# Patient Record
Sex: FEMALE | Race: OTHER
[De-identification: names, ages, dates, MRNs, and addresses within clinical notes are randomized per-mention and may not be internally consistent; named-entity substitution may affect disease eponyms.]

---

## 2017-07-04 ENCOUNTER — HOSPITAL ENCOUNTER (EMERGENCY)
Dept: HOSPITAL 62 - ER | Age: 62
LOS: 1 days | Discharge: HOME | End: 2017-07-05
Payer: SELF-PAY

## 2017-07-04 VITALS — DIASTOLIC BLOOD PRESSURE: 94 MMHG | SYSTOLIC BLOOD PRESSURE: 156 MMHG

## 2017-07-04 DIAGNOSIS — R11.0: ICD-10-CM

## 2017-07-04 DIAGNOSIS — Z79.899: ICD-10-CM

## 2017-07-04 DIAGNOSIS — E78.5: ICD-10-CM

## 2017-07-04 DIAGNOSIS — R42: Primary | ICD-10-CM

## 2017-07-04 DIAGNOSIS — I10: ICD-10-CM

## 2017-07-04 DIAGNOSIS — E87.6: ICD-10-CM

## 2017-07-04 LAB
APPEARANCE UR: CLEAR
BASOPHILS # BLD AUTO: 0.1 10^3/UL (ref 0–0.2)
BASOPHILS NFR BLD AUTO: 1.1 % (ref 0–2)
BILIRUB UR QL STRIP: NEGATIVE
EOSINOPHIL # BLD AUTO: 0.2 10^3/UL (ref 0–0.6)
EOSINOPHIL NFR BLD AUTO: 2.2 % (ref 0–6)
ERYTHROCYTE [DISTWIDTH] IN BLOOD BY AUTOMATED COUNT: 13.1 % (ref 11.5–14)
GLUCOSE UR STRIP-MCNC: NEGATIVE MG/DL
HCT VFR BLD CALC: 43.7 % (ref 36–47)
HGB BLD-MCNC: 14.6 G/DL (ref 12–15.5)
HGB HCT DIFFERENCE: 0.1
KETONES UR STRIP-MCNC: NEGATIVE MG/DL
LYMPHOCYTES # BLD AUTO: 3.5 10^3/UL (ref 0.5–4.7)
LYMPHOCYTES NFR BLD AUTO: 38.4 % (ref 13–45)
MCH RBC QN AUTO: 30.7 PG (ref 27–33.4)
MCHC RBC AUTO-ENTMCNC: 33.5 G/DL (ref 32–36)
MCV RBC AUTO: 92 FL (ref 80–97)
MONOCYTES # BLD AUTO: 0.6 10^3/UL (ref 0.1–1.4)
MONOCYTES NFR BLD AUTO: 7 % (ref 3–13)
NEUTROPHILS # BLD AUTO: 4.6 10^3/UL (ref 1.7–8.2)
NEUTS SEG NFR BLD AUTO: 51.3 % (ref 42–78)
NITRITE UR QL STRIP: NEGATIVE
PH UR STRIP: 7 [PH] (ref 5–9)
PROT UR STRIP-MCNC: NEGATIVE MG/DL
RBC # BLD AUTO: 4.76 10^6/UL (ref 3.72–5.28)
SP GR UR STRIP: 1
UROBILINOGEN UR-MCNC: NEGATIVE MG/DL (ref ?–2)
WBC # BLD AUTO: 9 10^3/UL (ref 4–10.5)

## 2017-07-04 PROCEDURE — 85025 COMPLETE CBC W/AUTO DIFF WBC: CPT

## 2017-07-04 PROCEDURE — 93010 ELECTROCARDIOGRAM REPORT: CPT

## 2017-07-04 PROCEDURE — 93005 ELECTROCARDIOGRAM TRACING: CPT

## 2017-07-04 PROCEDURE — 99284 EMERGENCY DEPT VISIT MOD MDM: CPT

## 2017-07-04 PROCEDURE — 36415 COLL VENOUS BLD VENIPUNCTURE: CPT

## 2017-07-04 PROCEDURE — 81001 URINALYSIS AUTO W/SCOPE: CPT

## 2017-07-04 PROCEDURE — S0119 ONDANSETRON 4 MG: HCPCS

## 2017-07-04 PROCEDURE — 80048 BASIC METABOLIC PNL TOTAL CA: CPT

## 2017-07-04 NOTE — ER DOCUMENT REPORT
ED General





- General


Chief Complaint: Dizziness, can't sleep


Stated Complaint: FEELING DIZZY/CANNOT SLEEP


Time Seen by Provider: 17 22:59


Notes: 


Patient is a 62 year old female that comes emergency department for chief 

complaint of of a strange floating sensation and lightheadedness that she gets 

when she turns her head to the side.  She states she got up this morning and 

this evening, she felt a little bit of nausea with it when it happened.  She 

denies any current symptoms.  She denies head injury, focal numbness or weakness

, visual changes, headache, chest pain, shortness of breath.  Patient is on 

blood pressure medications and medications for hyperlipidemia.  She is 

compliant with her medications.  She is Portuguese-speaking, and Pikimal 

 symptom was used.





TRAVEL OUTSIDE OF THE [place] IN LAST 30 DAYS: No





- Related Data


Allergies/Adverse Reactions: 


 





No Known Allergies Allergy (Verified 16 13:27)


 








Home Medications: 


 Current Home Medications





Losartan/Hydrochlorothiazide [Hyzaar 100-25 Tablet] 1 each PO DAILY 17 [

History]











Past Medical History





- General


Information source: Patient





- Social History


Smoking Status: Never Smoker


Frequency of alcohol use: None


Drug Abuse: None


Lives with: Family


Family History: None, Reviewed & Not Pertinent





- Past Medical History


Cardiac Medical History: Reports: Hx Hypertension


Neurological Medical History: Reports: Hx Seizures


Renal/ Medical History: Denies: Hx Peritoneal Dialysis


Past Surgical History: Reports: Hx  Section - 2, Hx Cholecystectomy - 

unsure may have been removal gall stones





- Immunizations


Hx Diphtheria, Pertussis, Tetanus Vaccination: Yes - unknown





Review of Systems





- Review of Systems


Constitutional: No symptoms reported


EENT: No symptoms reported


Cardiovascular: See HPI


Respiratory: No symptoms reported


Gastrointestinal: No symptoms reported


Genitourinary: No symptoms reported


Female Genitourinary: No symptoms reported


Musculoskeletal: No symptoms reported


Skin: No symptoms reported


Hematologic/Lymphatic: No symptoms reported


Neurological/Psychological: See HPI





Physical Exam





- Vital signs


Vitals: 


 











Temp Pulse BP Pulse Ox


 


 97.8 F   79   156/94 H  98 


 


 17 22:00  17 22:00  17 22:00  17 22:00











Interpretation: Normal





- General


General appearance: Appears well, Alert


In distress: None - Smiling, alert, well-appearing





- HEENT


Head: Normocephalic, Atraumatic


Eyes: Normal


Conjunctiva: Normal


Extraocular movements intact: Yes


Eyelashes: Normal


Pupils: PERRL


Nasal: Normal


Mouth/Lips: Normal


Mucous membranes: Normal


Pharynx: Normal


Neck: Normal





- Respiratory


Respiratory status: No respiratory distress


Chest status: Nontender


Breath sounds: Normal


Chest palpation: Normal





- Cardiovascular


Rhythm: Regular


Heart sounds: Normal auscultation


Murmur: No





- Abdominal


Inspection: Normal


Distension: No distension


Bowel sounds: Normal


Tenderness: Nontender.  No: Tender, Guarding


Organomegaly: No organomegaly





- Back


Back: Normal, Nontender.  No: Tender, Vertebra tenderness





- Extremities


General upper extremity: Normal inspection, Nontender, Normal color, Normal ROM

, Normal temperature


General lower extremity: Normal inspection, Nontender, Normal color, Normal ROM

, Normal temperature, Normal weight bearing.  No: Levi's sign





- Neurological


Neuro grossly intact: Yes


Cognition: Normal


Orientation: AAOx4


Brodie Coma Scale Eye Opening: Spontaneous


[place] Coma Scale Verbal: Oriented


[place] Coma Scale Motor: Obeys Commands


Brodie Coma Scale Total: 15


Speech: Normal


Motor strength normal: LUE, RUE, LLE, RLE


Sensory: Normal





- Psychological


Associated symptoms: Normal affect, Normal mood





- Skin


Skin Temperature: Warm


Skin Moisture: Dry


Skin Color: Normal





Course





- Re-evaluation


Re-evalutation: 


Patient reevaluated, she states that she actually feels a little bit of the 

dizziness at this time.  She speaks a little bit of English.





CBC unremarkable, chemistry shows mild hypokalemia, will supplement, EKG 

unremarkable, urine unremarkable.  No dehydration, anemia, hypoglycemia, or 

other concerns noted.  On reevaluation after medications patient smiling, 

states that her symptoms resolved.  Son is now at bedside, speaks good English, 

is interpreting, I discussed all results in detail, examination is most 

consistent with labyrinthitis/vestibulitis, no evidence of intracranial 

abnormality with normal neurological exam, patient has normal gait, is 

asymptomatic again.  Discussed treatment, follow-up, return precautions, 

patient and son state understanding and agreement.





- Vital Signs


Vital signs: 


 











Temp Pulse Resp BP Pulse Ox


 


 97.8 F   79      156/94 H  98 


 


 17 22:00  17 22:00     17 22:00  17 22:00














- Laboratory


Result Diagrams: 


 17 23:33





 17 23:33


Laboratory results interpreted by me: 


 











  17





  23:33


 


Potassium  3.5 L














Discharge





- Discharge


Clinical Impression: 


 Dizziness





Condition: Stable


Disposition: HOME, SELF-CARE


Additional Instructions: 


Your symptoms and response to treatment are very suggestive of vertigo.  This 

is an inner ear condition, take the prescribed medication to help treat this.  

Your potassium was slightly low, we have given you a dose tonight.  Follow-up 

with your primary care provider.  Return to emergency department for any 

concerning or worsening symptoms including vomiting, severe headache, weakness 

on one side of your body, or any other concerning symptoms


Prescriptions: 


Meclizine HCl [Bonine] 25 mg PO TID #24 tab.chew


Forms:  Return to Work


Referrals: 


[facility] [Primary Care Provider] - Follow up as needed

## 2017-07-05 LAB
ANION GAP SERPL CALC-SCNC: 11 MMOL/L (ref 5–19)
BUN SERPL-MCNC: 9 MG/DL (ref 7–20)
CALCIUM: 9.9 MG/DL (ref 8.4–10.2)
CHLORIDE SERPL-SCNC: 103 MMOL/L (ref 98–107)
CO2 SERPL-SCNC: 28 MMOL/L (ref 22–30)
CREAT SERPL-MCNC: 0.61 MG/DL (ref 0.52–1.25)
GLUCOSE SERPL-MCNC: 99 MG/DL (ref 75–110)
POTASSIUM SERPL-SCNC: 3.5 MMOL/L (ref 3.6–5)
SODIUM SERPL-SCNC: 141.5 MMOL/L (ref 137–145)

## 2017-11-24 ENCOUNTER — HOSPITAL ENCOUNTER (EMERGENCY)
Dept: HOSPITAL 62 - ER | Age: 62
Discharge: ANOTHER HOSPITAL | End: 2017-11-24
Payer: MEDICAID

## 2017-11-24 VITALS — SYSTOLIC BLOOD PRESSURE: 148 MMHG | DIASTOLIC BLOOD PRESSURE: 86 MMHG

## 2017-11-24 DIAGNOSIS — Z90.49: ICD-10-CM

## 2017-11-24 DIAGNOSIS — Z87.891: ICD-10-CM

## 2017-11-24 DIAGNOSIS — I10: ICD-10-CM

## 2017-11-24 DIAGNOSIS — G40.909: ICD-10-CM

## 2017-11-24 DIAGNOSIS — S06.2X9A: Primary | ICD-10-CM
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
## 2017-11-24 LAB
ALBUMIN SERPL-MCNC: 4.4 G/DL (ref 3.5–5)
ALP SERPL-CCNC: 107 U/L (ref 38–126)
ALT SERPL-CCNC: 28 U/L (ref 9–52)
ANION GAP SERPL CALC-SCNC: 13 MMOL/L (ref 5–19)
APPEARANCE UR: CLEAR
AST SERPL-CCNC: 25 U/L (ref 14–36)
BASOPHILS # BLD AUTO: 0.1 10^3/UL (ref 0–0.2)
BASOPHILS NFR BLD AUTO: 0.7 % (ref 0–2)
BILIRUB DIRECT SERPL-MCNC: 0.4 MG/DL (ref 0–0.4)
BILIRUB SERPL-MCNC: 0.5 MG/DL (ref 0.2–1.3)
BILIRUB UR QL STRIP: NEGATIVE
BUN SERPL-MCNC: 17 MG/DL (ref 7–20)
CALCIUM: 9.4 MG/DL (ref 8.4–10.2)
CHLORIDE SERPL-SCNC: 102 MMOL/L (ref 98–107)
CO2 SERPL-SCNC: 26 MMOL/L (ref 22–30)
CREAT SERPL-MCNC: 0.79 MG/DL (ref 0.52–1.25)
EOSINOPHIL # BLD AUTO: 0.2 10^3/UL (ref 0–0.6)
EOSINOPHIL NFR BLD AUTO: 1.8 % (ref 0–6)
ERYTHROCYTE [DISTWIDTH] IN BLOOD BY AUTOMATED COUNT: 13.4 % (ref 11.5–14)
GLUCOSE SERPL-MCNC: 121 MG/DL (ref 75–110)
GLUCOSE UR STRIP-MCNC: NEGATIVE MG/DL
HCT VFR BLD CALC: 43.5 % (ref 36–47)
HGB BLD-MCNC: 14.7 G/DL (ref 12–15.5)
HGB HCT DIFFERENCE: 0.6
KETONES UR STRIP-MCNC: NEGATIVE MG/DL
LYMPHOCYTES # BLD AUTO: 4.4 10^3/UL (ref 0.5–4.7)
LYMPHOCYTES NFR BLD AUTO: 40.4 % (ref 13–45)
MCH RBC QN AUTO: 31.3 PG (ref 27–33.4)
MCHC RBC AUTO-ENTMCNC: 33.7 G/DL (ref 32–36)
MCV RBC AUTO: 93 FL (ref 80–97)
MONOCYTES # BLD AUTO: 0.5 10^3/UL (ref 0.1–1.4)
MONOCYTES NFR BLD AUTO: 4.7 % (ref 3–13)
NEUTROPHILS # BLD AUTO: 5.7 10^3/UL (ref 1.7–8.2)
NEUTS SEG NFR BLD AUTO: 52.4 % (ref 42–78)
NITRITE UR QL STRIP: NEGATIVE
PH UR STRIP: 5 [PH] (ref 5–9)
POTASSIUM SERPL-SCNC: 3.3 MMOL/L (ref 3.6–5)
PROT SERPL-MCNC: 7 G/DL (ref 6.3–8.2)
PROT UR STRIP-MCNC: NEGATIVE MG/DL
RBC # BLD AUTO: 4.68 10^6/UL (ref 3.72–5.28)
SODIUM SERPL-SCNC: 141.3 MMOL/L (ref 137–145)
SP GR UR STRIP: 1.02
UROBILINOGEN UR-MCNC: NEGATIVE MG/DL (ref ?–2)
WBC # BLD AUTO: 10.9 10^3/UL (ref 4–10.5)

## 2017-11-24 PROCEDURE — 81001 URINALYSIS AUTO W/SCOPE: CPT

## 2017-11-24 PROCEDURE — 72125 CT NECK SPINE W/O DYE: CPT

## 2017-11-24 PROCEDURE — 85025 COMPLETE CBC W/AUTO DIFF WBC: CPT

## 2017-11-24 PROCEDURE — 80053 COMPREHEN METABOLIC PANEL: CPT

## 2017-11-24 PROCEDURE — 36415 COLL VENOUS BLD VENIPUNCTURE: CPT

## 2017-11-24 PROCEDURE — 96375 TX/PRO/DX INJ NEW DRUG ADDON: CPT

## 2017-11-24 PROCEDURE — 93005 ELECTROCARDIOGRAM TRACING: CPT

## 2017-11-24 PROCEDURE — 84484 ASSAY OF TROPONIN QUANT: CPT

## 2017-11-24 PROCEDURE — 99285 EMERGENCY DEPT VISIT HI MDM: CPT

## 2017-11-24 PROCEDURE — 70450 CT HEAD/BRAIN W/O DYE: CPT

## 2017-11-24 PROCEDURE — 96365 THER/PROPH/DIAG IV INF INIT: CPT

## 2017-11-24 PROCEDURE — 93010 ELECTROCARDIOGRAM REPORT: CPT

## 2017-11-24 NOTE — RADIOLOGY REPORT (SQ)
EXAM DESCRIPTION:  CT CERVICAL SPINE WITHOUT



COMPLETED DATE/TIME:  11/24/2017 1:54 pm



REASON FOR STUDY:  seizure, fall, contusion



COMPARISON:  None.



TECHNIQUE:  Axial images acquired through the cervical spine without intravenous contrast.  Images re
viewed with lung, soft tissue and bone windows.  Reconstructed coronal and sagittal MPR images review
ed.  Images stored on PACS.

All CT scanners at this facility use dose modulation, iterative reconstruction, and/or weight based d
osing when appropriate to reduce radiation dose to as low as reasonably achievable (ALARA).

[facility]: Dose Right  [facility]: CareDose    [facility]: Dose Right    [facility]: Teradose 4D    [facility]: Smart TAKO



RADIATION DOSE:  CT Rad equipment meets quality standard of care and radiation dose reduction techniq
ues were employed. CTDIvol: 21.8 mGy. DLP: 460 mGy-cm. mGy.



LIMITATIONS:  None.



FINDINGS:  ALIGNMENT: Anatomic.

MINERALIZATION: Normal.

VERTEBRAL BODIES: No fractures or dislocation.

DISCS: Disc spaces are narrowed from C5-C7 with anterior and posterior osteophytes.

FACETS, LATERAL MASSES, POSTERIOR ELEMENTS: Hypertrophic facet changes are present on the left at C7-
T1.

HARDWARE: None in the spine.

VISUALIZED RIBS: No fractures.

LUNG APICES AND SOFT TISSUES: No significant or acute findings.

OTHER: No other significant finding.



IMPRESSION:  Degenerative disc disease with mild spondylosis and facet arthropathy.



TECHNICAL DOCUMENTATION:  JOB ID:  [id number]

Quality ID # [id number]: Final reports with documentation of one or more dose reduction techniques (e.g., Au
tomated exposure control, adjustment of the mA and/or kV according to patient size, use of iterative 
reconstruction technique)

 2011 [employer]- All Rights Reserved

## 2017-11-24 NOTE — ER DOCUMENT REPORT
ED Seizure





- General


Chief Complaint: Probable Seizure


Stated Complaint: PROBABLE SEIZURE


Time Seen by Provider: 17 12:07


Notes: 





62-year-old female history of seizure disorder 20 years ago.  Had a seizure not 

too long ago.  Had another seizure today.  Seizure lasted for a while.  Fell 

and hit her head.  Complaining of pain on the right side of her head.  

Currently not on seizure medications.  Was recently started on some antibiotics 

for possible sinus infection.  During the initial questioning with the [place] 

 [place] patient had a grand mal seizure.  2 mg of Ativan was given IV.





- HPI


Patient complains to provider of: History of seizures


Quality of pain: Dull


Severity: Moderate


Pain Level: 3


Continued on arrival to ED: Yes


Can details of seizure be obtained/verified: Yes


Current seizure medications: No: Carbamazepine, Gabatin, Keppra, Lamictal, 

Phenytoin, Phenobarbital, Trileptal, Valproic acid, Vimpat, Other


Preceding symptoms/context: denies: Recent illness/fever, Recent alcohol intake

, Recent drug use, Sleep deprivation, Missed dose of meds, Changed meds or 

dosage, Somnolence, Other


History of: denies: Brain tumor or mets, CVA, Hydrocephalus, Migraines, TBI, V/

P shunt, Other


Character of seizure: Complete loss/conscious, Generalized shaking


Post-ictal symptoms: Confusion, Headache


Injuries: None


Treatment PTA: No: Advanced airway, Ativan, Valium, Other


Associated Symptoms: Confusion





- Related Data


Allergies/Adverse Reactions: 


 





No Known Allergies Allergy (Verified 16 13:27)


 











Past Medical History





- General


Information source: Patient





- Social History


Smoking Status: Former Smoker


Chew tobacco use (# tins/day): No


Frequency of alcohol use: None


Drug Abuse: None


Lives with: Family


Family History: None, Reviewed & Not Pertinent


Patient has suicidal ideation: No


Patient has homicidal ideation: No





- Past Medical History


Cardiac Medical History: Reports: Hx Hypertension


Neurological Medical History: Reports: Hx Seizures


Renal/ Medical History: Denies: Hx Peritoneal Dialysis


Past Surgical History: Reports: Hx  Section - 2, Hx Cholecystectomy - 

unsure may have been removal gall stones





- Immunizations


Hx Diphtheria, Pertussis, Tetanus Vaccination: Yes - unknown





Review of Systems





- Review of Systems


-: Yes ROS unobtainable due to patient's medical condition





Physical Exam





- Vital signs


Vitals: 


 











Pulse


 


 108 H


 


 17 11:42











Interpretation: Normal





- Notes


Notes: 





In my exam patient screamed very loudly and then immediately had a witnessed 

legitimate seizure.  Seizure lasted for 45 seconds the patient postictal.





- General


General appearance: Appears well, Alert, Other - Patient was alert and then 

developed seizure now is postictal





- HEENT


Head: Normocephalic, Atraumatic


Eyes: Normal


Pupils: PERRL


Mucous membranes: Other - There is a laceration/abrasion to the right lateral 

tongue with small amount of bleeding





- Respiratory


Respiratory status: No respiratory distress


Chest status: Nontender


Breath sounds: Normal


Chest palpation: Normal





- Cardiovascular


Rhythm: Regular


Heart sounds: Normal auscultation


Murmur: No





- Abdominal


Inspection: Normal


Distension: No distension


Bowel sounds: Normal


Tenderness: Nontender


Organomegaly: No organomegaly





- Back


Back: Normal, Nontender





- Extremities


General upper extremity: Normal inspection, Nontender, Normal color, Normal ROM

, Normal temperature


General lower extremity: Normal inspection, Nontender, Normal color, Normal ROM

, Normal temperature, Normal weight bearing.  No: Levi's sign





- Neurological


Neuro grossly intact: Yes


Cognition: Normal


Orientation: Disoriented to events


Brodie Coma Scale Eye Opening: Spontaneous


Brodie Coma Scale Verbal: Confused


Brodie Coma Scale Motor: Localizes to Pain


Brodie Coma Scale Total: 13


Cranial nerves: Normal


Motor strength normal: LUE, RUE, LLE, RLE


Sensory: Normal





- Psychological


Associated symptoms: Normal affect, Normal mood





- Skin


Skin Temperature: Warm


Skin Moisture: Dry


Skin Color: Normal





Course





- Re-evaluation


Re-evalutation: 





17 14:20


Patient had witnessed seizure.  Will will get head CT, C-spine, labs, Ativan 

and reassess.  She has had a change in her seizure pattern with increased 

frequency.  Will load with Keppra.  May need to be admitted with her head 

injury and repeat seizure.  Currently there is no neurologist available.


17 14:21





Laboratory











  17





  11:50 11:50 11:50


 


WBC  10.9 H  


 


RBC  4.68  


 


Hgb  14.7  


 


Hct  43.5  


 


MCV  93  


 


MCH  31.3  


 


MCHC  33.7  


 


RDW  13.4  


 


Plt Count  286  


 


Seg Neutrophils %  52.4  


 


Lymphocytes %  40.4  


 


Monocytes %  4.7  


 


Eosinophils %  1.8  


 


Basophils %  0.7  


 


Absolute Neutrophils  5.7  


 


Absolute Lymphocytes  4.4  


 


Absolute Monocytes  0.5  


 


Absolute Eosinophils  0.2  


 


Absolute Basophils  0.1  


 


Sodium   141.3 


 


Potassium   3.3 L 


 


Chloride   102 


 


Carbon Dioxide   26 


 


Anion Gap   13 


 


BUN   17 


 


Creatinine   0.79 


 


Est GFR ( Amer)   > 60 


 


Est GFR (Non-Af Amer)   > 60 


 


Glucose   121 H 


 


Calcium   9.4 


 


Total Bilirubin   0.5 


 


Direct Bilirubin   0.4 


 


Neonat Total Bilirubin   Not Reportable 


 


Neonat Direct Bilirubin   Not Reportable 


 


Neonat Indirect Bili   Not Reportable 


 


AST   25 


 


ALT   28 


 


Alkaline Phosphatase   107 


 


Troponin I    0.054


 


Total Protein   7.0 


 


Albumin   4.4 


 


Urine Color   


 


Urine Appearance   


 


Urine pH   


 


Ur Specific Gravity   


 


Urine Protein   


 


Urine Glucose (UA)   


 


Urine Ketones   


 


Urine Blood   


 


Urine Nitrite   


 


Urine Bilirubin   


 


Urine Urobilinogen   


 


Ur Leukocyte Esterase   


 


Urine WBC (Auto)   


 


Urine RBC (Auto)   


 


Squamous Epi Cells Auto   


 


Urine Mucus (Auto)   


 


Urine Ascorbic Acid   














  17





  13:19


 


WBC 


 


RBC 


 


Hgb 


 


Hct 


 


MCV 


 


MCH 


 


MCHC 


 


RDW 


 


Plt Count 


 


Seg Neutrophils % 


 


Lymphocytes % 


 


Monocytes % 


 


Eosinophils % 


 


Basophils % 


 


Absolute Neutrophils 


 


Absolute Lymphocytes 


 


Absolute Monocytes 


 


Absolute Eosinophils 


 


Absolute Basophils 


 


Sodium 


 


Potassium 


 


Chloride 


 


Carbon Dioxide 


 


Anion Gap 


 


BUN 


 


Creatinine 


 


Est GFR ( Amer) 


 


Est GFR (Non-Af Amer) 


 


Glucose 


 


Calcium 


 


Total Bilirubin 


 


Direct Bilirubin 


 


Neonat Total Bilirubin 


 


Neonat Direct Bilirubin 


 


Neonat Indirect Bili 


 


AST 


 


ALT 


 


Alkaline Phosphatase 


 


Troponin I 


 


Total Protein 


 


Albumin 


 


Urine Color  YELLOW


 


Urine Appearance  CLEAR


 


Urine pH  5.0


 


Ur Specific Gravity  1.017


 


Urine Protein  NEGATIVE


 


Urine Glucose (UA)  NEGATIVE


 


Urine Ketones  NEGATIVE


 


Urine Blood  SMALL H


 


Urine Nitrite  NEGATIVE


 


Urine Bilirubin  NEGATIVE


 


Urine Urobilinogen  NEGATIVE


 


Ur Leukocyte Esterase  NEGATIVE


 


Urine WBC (Auto)  1


 


Urine RBC (Auto)  1


 


Squamous Epi Cells Auto  <1


 


Urine Mucus (Auto)  RARE


 


Urine Ascorbic Acid  NEGATIVE











17 14:28


Patient has a cortical injury on the right hemisphere consistent with a small 

intraparenchymal bleed according to verbal report by radiologist.  Attempting 

to secure transport immediately at this time.


17 15:17





 





Cervical Spine CT  17 12:49


IMPRESSION:  Degenerative disc disease with mild spondylosis and facet 

arthropathy.


 








Head CT  17 12:49


IMPRESSION:  There are findings in the right parietal lobe concerning for 

cortical contusion.


EVIDENCE OF ACUTE STROKE: NO.


 








Dr. House is a ER to ER transfer/trauma team.  Did not have neuro ICU 

available here.  No neurologist.  Will transfer at this time.





- Vital Signs


Vital signs: 


 











Temp Pulse Resp BP Pulse Ox


 


 97.3 F   108 H  20   135/92 H  93 


 


 17 11:55  17 11:55  17 13:27  17 13:27  17 13:27














- Laboratory


Result Diagrams: 


 17 11:50





 17 11:50


Laboratory results interpreted by me: 


 











  17





  11:50 11:50 13:19


 


WBC  10.9 H  


 


Potassium   3.3 L 


 


Glucose   121 H 


 


Urine Blood    SMALL H














Critical Care Note





- Critical Care Note


Total time excluding time spent on procedures (mins): 90


Comments: 





Intracranial hemorrhage, seizure disorder





Discharge





- Discharge


Clinical Impression: 


 Contusion of cortex of right cerebral hemisphere, Seizure





Condition: Fair


Disposition: Replaced by [facility]


Referrals: 


VALORIE MCKNIGHT MD [Primary Care Provider] - Follow up as needed

## 2017-11-24 NOTE — RADIOLOGY REPORT (SQ)
EXAM DESCRIPTION:  CT HEAD WITHOUT



COMPLETED DATE/TIME:  11/24/2017 1:54 pm



REASON FOR STUDY:  seizure, fall, contusion



COMPARISON:  3/10/2015



TECHNIQUE:  Axial images acquired through the brain without intravenous contrast.  Images reviewed wi
th bone, brain and subdural windows.  Images stored on PACS.

All CT scanners at this facility use dose modulation, iterative reconstruction, and/or weight based d
osing when appropriate to reduce radiation dose to as low as reasonably achievable (ALARA).

[facility]: Dose Right  [facility]: CareDose    [facility]: Dose Right    [facility]: Teradose 4D    [facility]: Smart [employer]



RADIATION DOSE:  CT Rad equipment meets quality standard of care and radiation dose reduction techniq
ues were employed. CTDIvol: 64.6 mGy. DLP: 1292 mGy-cm. mGy.



LIMITATIONS:  None.



FINDINGS:  VENTRICLES: Normal size and contour.

CEREBRUM: There is a limited area of increased attenuation in the periphery of the right parietal lob
e seen best on image 29 series 2 through image 32 series 2. Few scattered areas of low density in the
 white matter most likely chronic small vessel ischemic changes.

CEREBELLUM: No masses.  No hemorrhage.  No alteration of density.  No evidence for acute infarction.

EXTRAAXIAL SPACES: No fluid collections.  No masses.

ORBITS AND GLOBE: No intra- or extraconal masses.  Normal contour of globe without masses.

CALVARIUM: No fracture.

PARANASAL SINUSES: No fluid or mucosal thickening.

SOFT TISSUES: No mass or hematoma.

OTHER: No other significant finding.



IMPRESSION:  There are findings in the right parietal lobe concerning for cortical contusion.

EVIDENCE OF ACUTE STROKE: NO.



COMMENT:  Findings were discussed with the ordering physician at 0228 hours 1400 hours on this date.

Quality ID # 436: Final reports with documentation of one or more dose reduction techniques (e.g., Au
tomated exposure control, adjustment of the mA and/or kV according to patient size, use of iterative 
reconstruction technique)



TECHNICAL DOCUMENTATION:  JOB ID:  [id number]

 2011 [employer]- All Rights Reserved

## 2017-11-24 NOTE — EKG REPORT
SEVERITY:- ABNORMAL ECG -

SINUS TACHYCARDIA

PROBABLE LEFT ATRIAL ABNORMALITY

BORDERLINE LEFT AXIS DEVIATION

PROBABLE ANTEROSEPTAL INFARCT, AGE INDETERM

:

Confirmed by: Kelsey Cintron MD 24-Nov-2017 14:38:08

## 2018-09-29 ENCOUNTER — HOSPITAL ENCOUNTER (EMERGENCY)
Dept: HOSPITAL 62 - ER | Age: 63
Discharge: HOME | End: 2018-09-29
Payer: SELF-PAY

## 2018-09-29 VITALS — DIASTOLIC BLOOD PRESSURE: 89 MMHG | SYSTOLIC BLOOD PRESSURE: 136 MMHG

## 2018-09-29 DIAGNOSIS — L73.9: Primary | ICD-10-CM

## 2018-09-29 DIAGNOSIS — I10: ICD-10-CM

## 2018-09-29 DIAGNOSIS — E66.01: ICD-10-CM

## 2018-09-29 PROCEDURE — 99282 EMERGENCY DEPT VISIT SF MDM: CPT

## 2018-09-29 NOTE — ER DOCUMENT REPORT
ED General





- General


Chief Complaint: Toothache


Stated Complaint: FACIAL PAIN


Time Seen by Provider: 18 17:38


Mode of Arrival: Ambulatory


Information source: Patient, Relative


Notes: 





Patient is a 63-year-old morbidly obese female comes emergency room complaining 

of right-sided nasal pain.  Originally the tree has no red facial pain.  She is 

accompanied by a family member who speaks perfect English and speaks Scottish.  

Patient was speaks broken English.  Interpretation is done through the family 

member.  According to report patient states she started with pain on the right 

side of her nose area about 10 days ago.  She has been attempting to use home 

remedies without any success.  She states that it is so sensitive it shoots 

pain up through her eyes into her head.  She is unable to touch the side of her 

nose specifically when one spot.


TRAVEL OUTSIDE OF THE [place] IN LAST 30 DAYS: No





- HPI


Onset: Other - 10 days


Onset/Duration: Gradual, Persistent, Worse


Quality of pain: No pain


Severity: Severe


Pain Level: 4


Associated symptoms: Other - Nasal pain


Exacerbated by: Other - Touching


Relieved by: Denies


Similar symptoms previously: No


Recently seen / treated by doctor: No





- Related Data


Allergies/Adverse Reactions: 


 





No Known Allergies Allergy (Verified 18 17:13)


 











Past Medical History





- General


Information source: Patient, Relative





- Social History


Smoking Status: Never Smoker


Cigarette use (# per day): No


Chew tobacco use (# tins/day): No


Smoking Education Provided: No


Frequency of alcohol use: None


Drug Abuse: None


Lives with: Family


Family History: None, Reviewed & Not Pertinent


Patient has suicidal ideation: No


Patient has homicidal ideation: No





- Past Medical History


Cardiac Medical History: Reports: Hx Hypertension


Neurological Medical History: Reports: Hx Seizures


Renal/ Medical History: Denies: Hx Peritoneal Dialysis


Past Surgical History: Reports: Hx  Section - 2, Hx Cholecystectomy - 

unsure may have been removal gall stones





- Immunizations


Hx Diphtheria, Pertussis, Tetanus Vaccination: Yes - unknown





Review of Systems





- Review of Systems


Constitutional: No symptoms reported


EENT: Nose pain


Cardiovascular: No symptoms reported


Respiratory: No symptoms reported


Gastrointestinal: No symptoms reported


Genitourinary: No symptoms reported


Female Genitourinary: No symptoms reported


Musculoskeletal: No symptoms reported


Skin: No symptoms reported


Hematologic/Lymphatic: No symptoms reported


Neurological/Psychological: No symptoms reported


-: Yes All other systems reviewed and negative





Physical Exam





- Vital signs


Vitals: 





 











Temp Pulse Resp BP Pulse Ox


 


 98.2 F   89   14   144/90 H  96 


 


 18 17:15  18 17:15  18 17:15  18 17:15  18 17:15











Interpretation: Normal, Hypertensive





- Notes


Notes: 





Patient is well-nourished well-developed moderately obese female.  She appears 

in moderate amount of discomfort.





- General


General appearance: Alert, Anxious





- HEENT


Head: Normocephalic, Atraumatic


Eyes: Normal


Conjunctiva: Normal


Ears: Normal


External canal: Normal


Tympanic membrane: Normal


Sinus: Normal


Nasal: Swelling, Other - Examination patient's area of pain discomfort shows it 

to be the right side of the nose.  Specifically just above the crease.  It is 

exceptionally sensitive to touch.  Light touch send the patient off the bed.  

Further exam of the internal nare in the right side shows moderate amount of 

hair but no other really major abnormalities that can be seen with with the 

autoscope.  Hyper illumination from the inside out shows some thickening in the 

area where she is sensitive.  There is no sign of any lesions apparent to 

shingles.  The hypersensitive area seems to only be on the outside compressing 

from the inside there is no notable sensitivity or pain.  Manipulation around 

the area causes apprehension but does not cause any pain until you hit that 

specific spot..  No: Normal, Clear rhinorrhea


Mouth/Lips: Normal


Mucous membranes: Normal


Pharynx: Normal





- Respiratory


Respiratory status: No respiratory distress


Chest status: Nontender


Breath sounds: Normal.  No: Rales, Rhonchi, Stridor, Wheezing


Chest palpation: Normal





- Cardiovascular


Rhythm: Regular


Heart sounds: Normal auscultation


Murmur: No





- Neurological


Neuro grossly intact: Yes


Cognition: Normal


Orientation: AAOx4


[place] Coma Scale Eye Opening: Spontaneous


[place] Coma Scale Verbal: Oriented


[place] Coma Scale Motor: Obeys Commands


[place] Coma Scale Total: 15


Speech: Normal





- Skin


Skin Temperature: Warm


Skin Moisture: Dry


Skin Color: Normal, Other - The area around the right lateral side of the nose 

shows some mild thickening but no overt infectious type findings.  

Reexamination underneath the nose and in the nare does show some erythematous 

areas that are questionable for a cellulitic type presentation.





Course





- Re-evaluation


Re-evalutation: 





18 18:47


After my examination I was not sure which direction to go into.  I went and 

consulted with  and he came in and reexamined the patient with me.  

He was as stone as I was at first.  We elected to apply let to the area both 

inside and outside which did reduce patient's pain substantially.  

Reexamination we still feel this is a folliculitis type presentation and we 

will put her on lidocaine for numbing sensation.  We will also add Bactrim for 

infectious purposes.  And we will place her on some gabapentin for bed.  We 

will have her return to ER if this does not work.  She is also use warm 

compresses 3 times a day.





- Vital Signs


Vital signs: 





 











Temp Pulse Resp BP Pulse Ox


 


 98.2 F   89   14   144/90 H  96 


 


 18 17:15  18 17:15  18 17:15  18 17:15  18 17:15














Discharge





- Discharge


Clinical Impression: 


 Nasal folliculitis





Condition: Stable


Disposition: HOME, SELF-CARE


Instructions:  Folliculitis ([facility])


Additional Instructions: 


Home and rest.  Medication as prescribed.  Use the lidocaine gel as needed for 

numbing purposes.  You may apply to 3 times a day.  Also apply warm moist 

compresses to the area 3-4 times a day.  For 10-15 minutes.  Take the 

gabapentin at night before bed for sleep.  And the pain medication as needed.  

Return to ER if you have any concerns or problems or if it does not seem to be 

getting better after the first 48 hours.  Also highly suggest follow-up with 

your primary care for further investigation if we have not solve this problem 

totally.


Prescriptions: 


Gabapentin 800 mg PO HSP PRN #30 capsule


 PRN Reason: 


Hydrocodone/Acetaminophen [Norco 5-325 mg Tablet] 1 tab PO Q6 #12 tablet


Sulfamethoxazole/Trimethoprim [Bactrim Ds Tablet] 1 each PO BID #20 tablet

## 2018-11-17 ENCOUNTER — HOSPITAL ENCOUNTER (EMERGENCY)
Dept: HOSPITAL 62 - ER | Age: 63
Discharge: HOME | End: 2018-11-17
Payer: SELF-PAY

## 2018-11-17 VITALS — DIASTOLIC BLOOD PRESSURE: 77 MMHG | SYSTOLIC BLOOD PRESSURE: 139 MMHG

## 2018-11-17 DIAGNOSIS — Z90.49: ICD-10-CM

## 2018-11-17 DIAGNOSIS — I10: ICD-10-CM

## 2018-11-17 DIAGNOSIS — R21: ICD-10-CM

## 2018-11-17 DIAGNOSIS — M62.838: ICD-10-CM

## 2018-11-17 DIAGNOSIS — M25.512: Primary | ICD-10-CM

## 2018-11-17 PROCEDURE — 99283 EMERGENCY DEPT VISIT LOW MDM: CPT

## 2018-11-17 NOTE — ER DOCUMENT REPORT
ED Extremity Problem, Upper





- General


Chief Complaint: Shoulder Injury


Stated Complaint: COLLARBONE PAIN/SWELLING


Time Seen by Provider: 18 19:33


Mode of Arrival: Ambulatory


Information source: Patient, Relative


Notes: 





History translated with the help of son at bedside.  Patient is a Dominican 

speaker.  Patient given option of  services and declined, preferred 

for son to translate.





Patient is seated comfortably in bed in no acute distress, with even and 

unlabored breathing.





Patient is a 63-year-old  female presenting to the emergency department 

work for left-sided collarbone pain for 3 days with no history of injury to the 

area.  3 days ago she woke up with the pain and felt that her shoulder and 

trapezius area were slightly more swollen than usual, and her range of motion 

was limited in that shoulder.  She describes the pain as aching and tight.  She 

denies all other systemic symptoms including fever, headache, nausea and 

vomiting.  She has tried using icy hot and warm rags on the area but it has not 

helped.  Pain is worsened with movement and alleviated with rest.  She has 

experienced pain like this before in her right collarbone.  She has a PCP who 

she sees regularly.


TRAVEL OUTSIDE OF THE [place] IN LAST 30 DAYS: No





- HPI


Patient complains to provider of: Pain, Swelling, Left, Clavicle, Shoulder


Onset: Other


Recent injury: No


Where: Home


Quality of pain: Achy


Severity of pain: Moderate


Pain Level: 2


Associated symptoms: None


Exacerbated by: Movement


Relieved by: Rest


Similar symptoms previously: Yes - similar symptoms in R clavicle


Recently seen / treated by doctor: No





- Related Data


Allergies/Adverse Reactions: 


 





No Known Allergies Allergy (Verified 18 18:30)


 








Home Medications: losartan-hctz, diazepam





Past Medical History





- General


Information source: Relative





- Social History


Smoking Status: Former Smoker


Cigarette use (# per day): No - has 7.5 py


Frequency of alcohol use: None


Drug Abuse: None


Occupation: works in kitchen


Lives with: Family


Family History: None, Reviewed & Not Pertinent


Patient has suicidal ideation: No


Patient has homicidal ideation: No





- Medical History


Notes: 





seizure 1 year ago with fall and intracranial hemorrhage, HTN





- Past Medical History


Cardiac Medical History: Reports: Hx Hypertension


Pulmonary Medical History: Reports: None


EENT Medical History: Reports: None


Neurological Medical History: Reports: Hx Seizures


Endocrine Medical History: Reports: None


Renal/ Medical History: Reports: None.  Denies: Hx Peritoneal Dialysis


Malignancy Medical History: Reports: None


GI Medical History: Reports: None


Musculoskeletal Medical History: Reports None


Skin Medical History: Reports None


Psychiatric Medical History: Reports: None


Traumatic Medical History: Reports: Other - intracranial hemorrhage after fall 

during seizure


Infectious Medical History: Reports: None


Past Surgical History: Reports: Hx  Section - 2, Hx Cholecystectomy - 

unsure may have been removal gall stones





- Immunizations


Hx Diphtheria, Pertussis, Tetanus Vaccination: Yes - unknown





Review of Systems





- Review of Systems


Constitutional: No symptoms reported.  denies: Fever, Recent illness


EENT: No symptoms reported


Cardiovascular: No symptoms reported


Respiratory: No symptoms reported


Gastrointestinal: No symptoms reported


Genitourinary: No symptoms reported


Female Genitourinary: No symptoms reported


Musculoskeletal: See HPI, Muscle stiffness


Skin: No symptoms reported


Hematologic/Lymphatic: No symptoms reported


Neurological/Psychological: No symptoms reported.  denies: Weakness, Headaches





Physical Exam





- Vital signs


Vitals: 


 











Temp Pulse Resp BP Pulse Ox


 


 97.8 F   97   20   148/81 H  100 


 


 18 18:56  18 18:56  18 18:56  18 18:56  18 18:56














- General


General appearance: Appears well, Alert


In distress: None





- HEENT


Head: Normocephalic, Atraumatic


Eyes: Normal


Extraocular movements intact: Yes





- Respiratory


Respiratory status: No respiratory distress


Chest status: Nontender


Breath sounds: Normal


Chest palpation: Normal





- Cardiovascular


Rhythm: Regular


Heart sounds: Normal auscultation, S1 appreciated, S2 appreciated


Murmur: No


Normal capillary refill: Yes





- Abdominal


Inspection: Normal


Distension: No distension





- Back


Back: Normal, Nontender





- Extremities


General upper extremity: Other - Left shoulder has mild erythema noted along 

the superior portion of the trapezius, 2x 1 cm lesions noted along left lateral 

portion of the collarbone, no swelling noted along either clavicle, both 

shoulders show no deformities, left shoulder range of motion intact but pain 

noted with adduction past 90 degrees and forward extension past 90 degreees, 

right shoulder range of motion intact





- Neurological


Neuro grossly intact: Yes


Cognition: Normal


Orientation: AAOx4


[place] Coma Scale Eye Opening: Spontaneous


[place] Coma Scale Verbal: Oriented


[place] Coma Scale Motor: Obeys Commands


[place] Coma Scale Total: 15





- Psychological


Associated symptoms: Normal affect, Normal mood





- Skin


Skin Temperature: Warm


Skin Moisture: Dry


Skin Color: Normal


Skin irregularity: Lesion


Location of irregularity: Other - L lateral clavicle on superior aspect


Character of irregularity: Macular, Papular





Course





- Re-evaluation


Re-evalutation: 





18 20:45


Patient with what appears to be musculoskeletal pain to the left trapezius 

area.  Pain is reproduced with movement of left upper extremity and left 

lateral rotation of the head.  No meningismus.  No shoulder tenderness.  No 

chest pain or dyspnea.  Patient does have faint erythematous skin rash over the 

area of tenderness that is concerning for possible early shingles.  Discussed 

this possibility with patient and decision was made to start antiviral 

medication but also to cover with pain medicine to help with her 

musculoskeletal pain.





- Vital Signs


Vital signs: 


 











Temp Pulse Resp BP Pulse Ox


 


 98.0 F   89   20   139/77 H  99 


 


 18 20:58  18 20:58  18 20:58  18 20:58  18 20:58














Discharge





- Discharge


Clinical Impression: 


 Muscle spasm of left shoulder, Pain of left clavicle, Skin rash





Condition: Stable


Disposition: HOME, SELF-CARE


Instructions:  Muscle Relaxers ([facility]), Muscle Strain ([facility]), Shingles ([facility])


Additional Instructions: 


Return immediately for any new or worsening symptoms





Followup with your primary care provider, call tomorrow to make a followup 

appointment





You may use topical over-the-counter lidocaine patch to help with your pain 

symptoms.


Prescriptions: 


Cyclobenzaprine HCl [Flexeril 10 Mg Tablet] 10 mg PO TID #15 tablet


Valacyclovir HCl [Valacyclovir] 1,000 mg PO TID PRN #21 tablet


 PRN Reason: 


Referrals: 


ELENA BURNS MD [ACTIVE STAFF] - 18

## 2020-04-27 ENCOUNTER — HOSPITAL ENCOUNTER (EMERGENCY)
Dept: HOSPITAL 62 - ER | Age: 65
Discharge: HOME | End: 2020-04-27
Payer: MEDICARE

## 2020-04-27 VITALS — DIASTOLIC BLOOD PRESSURE: 78 MMHG | SYSTOLIC BLOOD PRESSURE: 142 MMHG

## 2020-04-27 DIAGNOSIS — I10: ICD-10-CM

## 2020-04-27 DIAGNOSIS — Z87.891: ICD-10-CM

## 2020-04-27 DIAGNOSIS — G50.0: Primary | ICD-10-CM

## 2020-04-27 LAB
ADD MANUAL DIFF: NO
ALBUMIN SERPL-MCNC: 4.1 G/DL (ref 3.5–5)
ALP SERPL-CCNC: 99 U/L (ref 38–126)
ANION GAP SERPL CALC-SCNC: 6 MMOL/L (ref 5–19)
AST SERPL-CCNC: 28 U/L (ref 14–36)
BASOPHILS # BLD AUTO: 0.1 10^3/UL (ref 0–0.2)
BASOPHILS NFR BLD AUTO: 1.2 % (ref 0–2)
BILIRUB DIRECT SERPL-MCNC: 0 MG/DL (ref 0–0.4)
BILIRUB SERPL-MCNC: 0.5 MG/DL (ref 0.2–1.3)
BUN SERPL-MCNC: 8 MG/DL (ref 7–20)
CALCIUM: 9.3 MG/DL (ref 8.4–10.2)
CHLORIDE SERPL-SCNC: 103 MMOL/L (ref 98–107)
CO2 SERPL-SCNC: 30 MMOL/L (ref 22–30)
EOSINOPHIL # BLD AUTO: 0.1 10^3/UL (ref 0–0.6)
EOSINOPHIL NFR BLD AUTO: 1.3 % (ref 0–6)
ERYTHROCYTE [DISTWIDTH] IN BLOOD BY AUTOMATED COUNT: 13.7 % (ref 11.5–14)
GLUCOSE SERPL-MCNC: 113 MG/DL (ref 75–110)
HCT VFR BLD CALC: 41.2 % (ref 36–47)
HGB BLD-MCNC: 14 G/DL (ref 12–15.5)
LYMPHOCYTES # BLD AUTO: 2.7 10^3/UL (ref 0.5–4.7)
LYMPHOCYTES NFR BLD AUTO: 33.4 % (ref 13–45)
MCH RBC QN AUTO: 30.8 PG (ref 27–33.4)
MCHC RBC AUTO-ENTMCNC: 34 G/DL (ref 32–36)
MCV RBC AUTO: 91 FL (ref 80–97)
MONOCYTES # BLD AUTO: 0.6 10^3/UL (ref 0.1–1.4)
MONOCYTES NFR BLD AUTO: 6.8 % (ref 3–13)
NEUTROPHILS # BLD AUTO: 4.7 10^3/UL (ref 1.7–8.2)
NEUTS SEG NFR BLD AUTO: 57.3 % (ref 42–78)
PLATELET # BLD: 286 10^3/UL (ref 150–450)
POTASSIUM SERPL-SCNC: 4.6 MMOL/L (ref 3.6–5)
PROT SERPL-MCNC: 7.4 G/DL (ref 6.3–8.2)
RBC # BLD AUTO: 4.56 10^6/UL (ref 3.72–5.28)
TOTAL CELLS COUNTED % (AUTO): 100 %
WBC # BLD AUTO: 8.2 10^3/UL (ref 4–10.5)

## 2020-04-27 PROCEDURE — 96374 THER/PROPH/DIAG INJ IV PUSH: CPT

## 2020-04-27 PROCEDURE — 85025 COMPLETE CBC W/AUTO DIFF WBC: CPT

## 2020-04-27 PROCEDURE — 70487 CT MAXILLOFACIAL W/DYE: CPT

## 2020-04-27 PROCEDURE — 36415 COLL VENOUS BLD VENIPUNCTURE: CPT

## 2020-04-27 PROCEDURE — 80053 COMPREHEN METABOLIC PANEL: CPT

## 2020-04-27 PROCEDURE — 99284 EMERGENCY DEPT VISIT MOD MDM: CPT

## 2020-04-27 NOTE — ER DOCUMENT REPORT
ED General





- General


Chief Complaint: Facial Swelling


Stated Complaint: MOUTH/FACIAL PAIN


Time Seen by Provider: 20 13:28


Mode of Arrival: Ambulatory


TRAVEL OUTSIDE OF THE [place] IN LAST 30 DAYS: No





- HPI


Notes: 





Patient presents with several days of right-sided face pain.  She states that 

she has severe facial pain.  It is worse when being touched and better if not 

touched.  She denies any vision changes.  No vomiting.  No fevers.  No cough or 

congestion.  No known COVID virus exposures.  She states she has had no trauma 

to this area.  She states she has felt congested.  The pain radiates throughout 

the right side of her face and is severe.  It is a sharp sensation.  No 

significant vision changes.





- Related Data


Allergies/Adverse Reactions: 


                                        





No Known Allergies Allergy (Verified 18 18:30)


   











Past Medical History





- General


Information source: Patient





- Social History


Smoking Status: Former Smoker


Frequency of alcohol use: None


Drug Abuse: None


Family History: None, Reviewed & Not Pertinent


Patient has suicidal ideation: No


Patient has homicidal ideation: No





- Past Medical History


Cardiac Medical History: Reports: Hx Hypertension


Neurological Medical History: Reports: Hx Seizures


Renal/ Medical History: Denies: Hx Peritoneal Dialysis


Past Surgical History: Reports: Hx  Section - 2, Hx Cholecystectomy - 

unsure may have been removal gall stones





- Immunizations


Hx Diphtheria, Pertussis, Tetanus Vaccination: Yes - unknown





Review of Systems





- Review of Systems


Constitutional: denies: Chills, Fever


Cardiovascular: denies: Chest pain, Dyspnea


Respiratory: denies: Cough, Short of breath


-: Yes All other systems reviewed and negative





Physical Exam





- Vital signs


Vitals: 


                                        











Temp Pulse Resp BP Pulse Ox


 


 98.0 F   86   18   198/105 H  98 


 


 20 13:29  20 13:29  20 13:29  20 13:29  20 13:29











Interpretation: Hypertensive





- General


General appearance: Appears well, Alert





- HEENT


Head: Normocephalic, Atraumatic


Eyes: Normal


Conjunctiva: Normal


Cornea: Normal


Eyelashes: Normal


Pupils: PERRL


Nasal: Normal


Mouth/Lips: Normal


Mucous membranes: Moist


Pharynx: Normal


Neck: Normal


Notes: 





The face has a normal inspection bilaterally.  No significant swelling is 

appreciated.  However she has severe pain to any touch to the maxillary and 

frontal areas on the right side.  The mandibular area on the right side is not 

tender.





- Respiratory


Respiratory status: No respiratory distress


Chest status: Nontender


Breath sounds: Normal


Chest palpation: Normal





- Cardiovascular


Rhythm: Regular


Heart sounds: Normal auscultation


Murmur: No





- Abdominal


Inspection: Normal


Distension: No distension


Bowel sounds: Normal


Tenderness: Nontender


Organomegaly: No organomegaly





- Back


Back: Normal, Nontender





- Extremities


General upper extremity: Normal inspection, Nontender, Normal color, Normal ROM,

Normal temperature


General lower extremity: Normal inspection, Nontender, Normal color, Normal ROM,

Normal temperature, Normal weight bearing.  No: Levi's sign





- Neurological


Neuro grossly intact: Yes


Cognition: Normal


Orientation: AAOx4


[place] Coma Scale Eye Opening: Spontaneous


[place] Coma Scale Verbal: Oriented


[place] Coma Scale Motor: Obeys Commands


[place] Coma Scale Total: 15


Speech: Normal


Motor strength normal: LUE, RUE, LLE, RLE


Sensory: Normal





- Psychological


Associated symptoms: Normal affect, Normal mood





- Skin


Skin Temperature: Warm


Skin Moisture: Dry


Skin Color: Normal





Course





- Re-evaluation


Re-evalutation: 





20 16:58


Patient presents with right-sided facial pain.  She has no evidence of 

infectious process.  No evidence of tooth abnormality.  No evidence of sinus 

abnormality.  Her presentation is not consistent with allergic reaction.  I see 

no evidence of any significant abnormality on exam.  Laboratories are 

essentially unremarkable.  CT is also unremarkable.  At this time I am going to 

treat her for trigeminal neuralgia and refer her to neurology.  Because am 

unsure how have the exact diagnosis I have stressed to her the importance of 

follow-up and that if she is unable to arrange for that she is to return to the 

emergency department for follow-up.  going to give keflex just in case early 

tooth infection that is not evident on exam. pt will not allow me to examine 

teeth due to pain and grabs my hand everytime I try to exam oral cavity.


20 17:00








- Vital Signs


Vital signs: 


                                        











Temp Pulse Resp BP Pulse Ox


 


 98.0 F   86   18   198/105 H  98 


 


 20 13:29  20 13:29  20 13:29  20 13:29  20 13:29














- Laboratory


Result Diagrams: 


                                 20 14:15





                                 20 14:15


Laboratory results interpreted by me: 


                                        











  20





  14:15


 


Glucose  113 H














- Diagnostic Test


Radiology reviewed: Image reviewed, Reports reviewed





Discharge





- Discharge


Clinical Impression: 


 Trigeminal neuralgia of right side of face





Condition: Stable


Disposition: HOME, SELF-CARE


Instructions:  Oral Narcotic Medication ([facility])


Additional Instructions: 


Please follow up with Dr. Holcomb within the next week.  If you are unable to 

see Dr. Holcomb in the next week, then please return to the emergency department

for a recheck.


Prescriptions: 


Cephalexin Monohydrate [Keflex 500 mg Capsule] 500 mg PO Q6H 7 Days #28 capsule


Hydrocodone/Acetaminophen [Norco 5-325 mg Tablet] 1 tab PO Q6 PRN 3 Days #12 

tablet


 PRN Reason: 


Referrals: 


MIMI HOLCOMB MD [COMMUNITY BASED STAFF] - Follow up in 1 week

## 2020-04-27 NOTE — ER DOCUMENT REPORT
ED Medical Screen (RME)





- General


Chief Complaint: Toothache


Stated Complaint: MOUTH/FACIAL PAIN


Time Seen by Provider: 20 13:28


Mode of Arrival: Ambulatory


Information source: Patient


Notes: 





65-year-old Romansh-speaking woman presents to the emergency department chief 

complaint of right-sided facial swelling.   service was used for 

patient interview.  Patient reports pain ongoing for the last few days, states 

pain and discomfort radiates up into her right cheek, nose and behind her eye.  

She denies any dental pain.  She denies any fever or drainage from any of the 

areas of discomfort.  She states she has never had anything like this before.





No obvious swelling noted in triage.  Tenderness upon palpation over maxillary 

sinuses.





I have greeted and performed a rapid initial assessment of this patient.  A 

comprehensive ED assessment and evaluation of the patient, analysis of test 

results and completion of the medical decision making process will be conducted 

by additional ED providers.  I have specifically instructed the patient or 

family members with the patient to immediately return to any nursing staff 

should anything change in the patient's condition or with their chief complaint.





TRAVEL OUTSIDE OF THE [place] IN LAST 30 DAYS: No





- Related Data


Allergies/Adverse Reactions: 


                                        





No Known Allergies Allergy (Verified 18 18:30)


   











Past Medical History





- Past Medical History


Cardiac Medical History: Reports: Hx Hypertension


Neurological Medical History: Reports: Hx Seizures


Renal/ Medical History: Denies: Hx Peritoneal Dialysis


Past Surgical History: Reports: Hx  Section - 2, Hx Cholecystectomy - 

unsure may have been removal gall stones





- Immunizations


Hx Diphtheria, Pertussis, Tetanus Vaccination: Yes - unknown

## 2020-04-27 NOTE — RADIOLOGY REPORT ([facility])
EXAM DESCRIPTION:  CT FACIAL AREA WITH



IMAGES COMPLETED DATE/TIME:  4/27/2020 2:33 pm



REASON FOR STUDY:  facial pain.  Pain around the right orbit and cheek.  No reported injury.



COMPARISON:  CT head, 11/24/2017.



TECHNIQUE:  Post contrast images through the facial bones and orbits windowed for bone and soft tissu
e.  Additional coronal and sagittal reconstructed images reviewed.  All images stored on PACS.

All CT scanners at this facility use dose modulation, iterative reconstruction, and/or weight based d
osing when appropriate to reduce radiation dose to as low as reasonably achievable (ALARA).

[facility]: Dose Right  [facility]: CareDose    [facility]: Dose Right    [facility]: Teradose 4D    [facility]: Smart Technologies



CONTRAST TYPE AND DOSE:  contrast/concentration: Isovue 350.00 mg/ml; Total Contrast Delivered: 75.0 
ml; Total Saline Delivered: 55.0 ml



RENAL FUNCTION:  GFR > 60.



RADIATION DOSE:  CT Rad equipment meets quality standard of care and radiation dose reduction techniq
ues were employed. CTDIvol: 30.4 mGy. DLP: 597 mGy-cm. .



LIMITATIONS:  None.



FINDINGS:  FACIAL BONES: No fracture or bone lesion.

ORBITS: Intact.  No fracture.  Symmetric intact globes and retroorbital soft tissues.

PARANASAL SINUSES: Clear.  No significant mucosal thickening, mass or fluid. No nasal polyps. Maxilla
ry sinus outlets are patent.

SOFT TISSUES: No mass or edema.  No abnormal enhancement.

INFERIOR BRAIN: Limited view.  No acute findings.

OTHER: No other significant finding.



IMPRESSION:  No CT abnormality of the facial bones or soft tissues.  No abnormality to explain the pa
tient's symptoms.



TECHNICAL DOCUMENTATION:  JOB ID:  [id number]

Quality ID # [id number]: Final reports with documentation of one or more dose reduction techniques (e.g., Au
tomated exposure control, adjustment of the mA and/or kV according to patient size, use of iterative 
reconstruction technique)

 2011 [employer]- All Rights Reserved



Reading location - IP/workstation name: [id number]

## 2020-06-06 ENCOUNTER — HOSPITAL ENCOUNTER (EMERGENCY)
Dept: HOSPITAL 62 - ER | Age: 65
Discharge: HOME | End: 2020-06-06
Payer: MEDICARE

## 2020-06-06 VITALS — SYSTOLIC BLOOD PRESSURE: 151 MMHG | DIASTOLIC BLOOD PRESSURE: 95 MMHG

## 2020-06-06 DIAGNOSIS — R51: Primary | ICD-10-CM

## 2020-06-06 DIAGNOSIS — Z90.49: ICD-10-CM

## 2020-06-06 DIAGNOSIS — R09.81: ICD-10-CM

## 2020-06-06 DIAGNOSIS — I10: ICD-10-CM

## 2020-06-06 LAB
ADD MANUAL DIFF: NO
ALBUMIN SERPL-MCNC: 4.4 G/DL (ref 3.5–5)
ALP SERPL-CCNC: 111 U/L (ref 38–126)
ANION GAP SERPL CALC-SCNC: 8 MMOL/L (ref 5–19)
AST SERPL-CCNC: 25 U/L (ref 14–36)
BASOPHILS # BLD AUTO: 0.1 10^3/UL (ref 0–0.2)
BASOPHILS NFR BLD AUTO: 1.3 % (ref 0–2)
BILIRUB DIRECT SERPL-MCNC: 0 MG/DL (ref 0–0.4)
BILIRUB SERPL-MCNC: 0.7 MG/DL (ref 0.2–1.3)
BUN SERPL-MCNC: 14 MG/DL (ref 7–20)
CALCIUM: 9.6 MG/DL (ref 8.4–10.2)
CHLORIDE SERPL-SCNC: 106 MMOL/L (ref 98–107)
CO2 SERPL-SCNC: 23 MMOL/L (ref 22–30)
EOSINOPHIL # BLD AUTO: 0.1 10^3/UL (ref 0–0.6)
EOSINOPHIL NFR BLD AUTO: 1.6 % (ref 0–6)
ERYTHROCYTE [DISTWIDTH] IN BLOOD BY AUTOMATED COUNT: 13.7 % (ref 11.5–14)
GLUCOSE SERPL-MCNC: 108 MG/DL (ref 75–110)
HCT VFR BLD CALC: 42.9 % (ref 36–47)
HGB BLD-MCNC: 14.9 G/DL (ref 12–15.5)
LYMPHOCYTES # BLD AUTO: 2.5 10^3/UL (ref 0.5–4.7)
LYMPHOCYTES NFR BLD AUTO: 31.1 % (ref 13–45)
MCH RBC QN AUTO: 31.5 PG (ref 27–33.4)
MCHC RBC AUTO-ENTMCNC: 34.8 G/DL (ref 32–36)
MCV RBC AUTO: 91 FL (ref 80–97)
MONOCYTES # BLD AUTO: 0.5 10^3/UL (ref 0.1–1.4)
MONOCYTES NFR BLD AUTO: 6.1 % (ref 3–13)
NEUTROPHILS # BLD AUTO: 4.8 10^3/UL (ref 1.7–8.2)
NEUTS SEG NFR BLD AUTO: 59.9 % (ref 42–78)
PLATELET # BLD: 313 10^3/UL (ref 150–450)
POTASSIUM SERPL-SCNC: 4.2 MMOL/L (ref 3.6–5)
PROT SERPL-MCNC: 8 G/DL (ref 6.3–8.2)
RBC # BLD AUTO: 4.74 10^6/UL (ref 3.72–5.28)
TOTAL CELLS COUNTED % (AUTO): 100 %
WBC # BLD AUTO: 7.9 10^3/UL (ref 4–10.5)

## 2020-06-06 PROCEDURE — 80053 COMPREHEN METABOLIC PANEL: CPT

## 2020-06-06 PROCEDURE — 96374 THER/PROPH/DIAG INJ IV PUSH: CPT

## 2020-06-06 PROCEDURE — 99284 EMERGENCY DEPT VISIT MOD MDM: CPT

## 2020-06-06 PROCEDURE — 70486 CT MAXILLOFACIAL W/O DYE: CPT

## 2020-06-06 PROCEDURE — 85025 COMPLETE CBC W/AUTO DIFF WBC: CPT

## 2020-06-06 PROCEDURE — 36415 COLL VENOUS BLD VENIPUNCTURE: CPT

## 2020-06-06 PROCEDURE — 96372 THER/PROPH/DIAG INJ SC/IM: CPT

## 2020-06-06 NOTE — RADIOLOGY REPORT (SQ)
EXAM DESCRIPTION:  CT FACIAL AREA WITHOUT



IMAGES COMPLETED DATE/TIME:  6/6/2020 11:52 am



REASON FOR STUDY:  paun



COMPARISON:  None.



TECHNIQUE:  Noncontrasted images through the facial bones and orbits windowed for bone and soft tissu
e.  Additional coronal and sagittal reconstructed images reviewed.  All images stored on PACS.

All CT scanners at this facility use dose modulation, iterative reconstruction, and/or weight based d
osing when appropriate to reduce radiation dose to as low as reasonably achievable (ALARA).

[facility]: Dose Right  [facility]: CareDose    [facility]: Dose Right    [facility]: Teradose 4D    [facility]: [employer] Technologies



RADIATION DOSE:  CT Rad equipment meets quality standard of care and radiation dose reduction techniq
ues were employed. CTDIvol: 30.4 mGy. DLP: 602 mGy-cm. mGy.



LIMITATIONS:  None.



FINDINGS:  FACIAL BONES: No fracture or bone lesion.

ORBITS: Intact.  No fracture.  Symmetric intact globes and retroorbital soft tissues.

PARANASAL SINUSES: Clear.  No significant mucosal thickening, mass or fluid. No nasal polyps.  Maxill
jasmin sinus outlets are patent.

SOFT TISSUES: No mass or edema.

INFERIOR BRAIN: Limited view.  No acute findings.

OTHER: No other significant finding.



IMPRESSION:  NO ACUTE FINDINGS.



TECHNICAL DOCUMENTATION:  JOB ID:  [id number]

[id number]

Quality ID # [id number]: Final reports with documentation of one or more dose reduction techniques (e.g., Au
tomated exposure control, adjustment of the mA and/or kV according to patient size, use of iterative 
reconstruction technique)

 2011 [employer]- All Rights Reserved



Reading location - IP/workstation name: [email or web address]

## 2020-06-06 NOTE — ER DOCUMENT REPORT
ED Oral Problem





- General


Chief Complaint: Mouth Problem


Stated Complaint: FACIAL PAIN


Time Seen by Provider: 20 15:07


Primary Care Provider: 


ZEFERINO HOLLOWAY MD [NO LOCAL MD] - Follow up in 3-5 days


Information source: Patient


Notes: 





Patient presents with a 2-day history of right side facial pain, tearing and 

nasal congestion.  Patient denies any fever nausea or vomiting.  Patient states 

she has had similar episodes as this in the past with her last episode about 2 

years ago.  Patient states that she has seen other providers and no one was able

to give her specific diagnosis.


TRAVEL OUTSIDE OF THE [place] IN LAST 30 DAYS: No





- HPI


Patient complains to provider of: Other - Facial pain


Onset: Other - 2 days


Quality of pain: Throbbing


Pain Level: 5


Associated symptoms: Facial pain.  denies: Decreased appetite, Fever, Toothache


Relieved by: Nothing


Similar symptoms previously: Yes


Recently seen / treated by doctor/dentist: No





- Related Data


Allergies/Adverse Reactions: 


                                        





No Known Allergies Allergy (Verified 18 18:30)


   











Past Medical History





- General


Information source: Patient





- Social History


Smoking Status: Never Smoker


Frequency of alcohol use: None


Drug Abuse: None


Lives with: Family


Family History: None, Reviewed & Not Pertinent


Patient has homicidal ideation: No





- Past Medical History


Cardiac Medical History: Reports: Hx Hypertension


Neurological Medical History: Reports: Hx Seizures


Renal/ Medical History: Denies: Hx Peritoneal Dialysis


Past Surgical History: Reports: Hx  Section - 2, Hx Cholecystectomy - 

unsure may have been removal gall stones





- Immunizations


Hx Diphtheria, Pertussis, Tetanus Vaccination: Yes - unknown





Review of Systems





- Review of Systems


Constitutional: No symptoms reported.  denies: Fever


EENT: Tearing, Nose congestion


Cardiovascular: No symptoms reported


Respiratory: No symptoms reported


Gastrointestinal: No symptoms reported.  denies: Nausea, Vomiting


Genitourinary: No symptoms reported


Female Genitourinary: No symptoms reported


Musculoskeletal: No symptoms reported


Skin: No symptoms reported.  denies: Rash


Hematologic/Lymphatic: No symptoms reported


Neurological/Psychological: Headaches





Physical Exam





- Vital signs


Vitals: 


                                        











Temp Pulse Resp BP Pulse Ox


 


 97.8 F   82   16   197/104 H  97 


 


 20 11:01  20 11:01  20 11:01  20 11:20 11:01














- General


General appearance: Appears well, Alert


In distress: Mild





- HEENT


Head: Normocephalic


Eyes: Tears - Right eye tearing


Conjunctiva: Normal


Extraocular movements intact: Yes


Eyelashes: Normal


Pupils: PERRL


Ears: Normal


External canal: Normal


Nasal: Normal


Mouth/Lips: Normal


Mucous membranes: Normal


Neck: Normal, Supple.  No: Lymphadenopathy, Meningismus





- Respiratory


Respiratory status: No respiratory distress


Chest status: Nontender


Breath sounds: Normal.  No: Rales, Rhonchi, Stridor, Wheezing


Chest palpation: Normal





- Cardiovascular


Rhythm: Regular


Heart sounds: S1 appreciated, S2 appreciated


Murmur: No





- Abdominal


Inspection: Obese


Distension: No distension


Tenderness: Nontender





- Back


Back: Normal





- Neurological


Neuro grossly intact: Yes


Cognition: Normal


[place] Coma Scale Eye Opening: Spontaneous


[place] Coma Scale Verbal: Oriented


[place] Coma Scale Motor: Obeys Commands


[place] Coma Scale Total: 15


Speech: Normal


Cranial nerves: Normal.  No: Facial palsy, Tongue deviation





- Psychological


Associated symptoms: Normal affect, Normal mood





- Skin


Skin Temperature: Warm


Skin Moisture: Dry


Skin Color: Normal





Course





- Re-evaluation


Re-evalutation: 





20 17:04


Patient reports pain is markedly improved and is down to 2/5 scale, additional 

pain medication ordered at this time.


20 17:44


Patient presents worrisome for possible cluster headache, pain resolved at this 

time.  The patient presents with headache without signs of CNS bleed, stroke, 

infection, or other serious etiology.  The patient is neurologically intact.  

Given the extremely low risk of these diagnoses further testing and evaluation 

for these possibilities does not appear to be indicated at this time.  The 

patient has been instructed to return if the symptoms worsen or change in any 

way.  Patient encouraged to follow-up with neurologist for further management


20 17:47








- Vital Signs


Vital signs: 


                                        











Temp Pulse Resp BP Pulse Ox


 


 98 F   72   16   151/95 H  99 


 


 20 18:21  20 18:21  20 18:21  20 18:21  20 18:21














- Laboratory


Result Diagrams: 


                                 20 11:25





                                 20 11:25


Laboratory results interpreted by me: 


                                        











  20





  11:25


 


Sodium  136.6 L














Discharge





- Discharge


Clinical Impression: 


 Facial pain





Headache


Qualifiers:


 Headache type: unspecified Headache chronicity pattern: unspecified pattern 

Intractability: not intractable Qualified Code(s): R51 - Headache





Condition: Stable


Disposition: HOME, SELF-CARE


Instructions:  Headache ([facility])


Additional Instructions: 


Return immediately for any new or worsening symptoms





Followup with your primary care provider, call tomorrow to make a followup anushka

ointment





Follow-up with neurologist for further evaluation and management


Prescriptions: 


Gabapentin [Neurontin 100 mg Capsule] 100 mg PO TID #15 capsule


Referrals: 


ZEFERINO HOLLOWAY MD [NO LOCAL MD] - Follow up in 3-5 days

## 2020-06-06 NOTE — ER DOCUMENT REPORT
ED Medical Screen (RME)





- General


Chief Complaint: Jaw Pain


Stated Complaint: FACIAL PAIN


Time Seen by Provider: 20 11:07


Information source: Patient


Notes: 





This 65-year-old female presented to the emergency room today stating that she 

has right upper jaw pain extending up into the orbital area she has tearing she 

is quite uncomfortable she has had this couple of times over the last 4 years.





I greeted and performed a rapid initial assessment of this patient. 

Comprehensive ED assessment and evaluation of the patient, analysis of test 

results and completion of the medical decision making process will be conducted 

by additional ED providers.


TRAVEL OUTSIDE OF THE [place] IN LAST 30 DAYS: No





- Related Data


Allergies/Adverse Reactions: 


                                        





No Known Allergies Allergy (Verified 18 18:30)


   











Past Medical History





- Past Medical History


Cardiac Medical History: Reports: Hx Hypertension


Neurological Medical History: Reports: Hx Seizures


Renal/ Medical History: Denies: Hx Peritoneal Dialysis


Past Surgical History: Reports: Hx  Section - 2, Hx Cholecystectomy - 

unsure may have been removal gall stones





- Immunizations


Hx Diphtheria, Pertussis, Tetanus Vaccination: Yes - unknown





Physical Exam





- Vital signs


Vitals: 





                                        











Temp Pulse Resp BP Pulse Ox


 


 97.8 F   82   16   197/104 H  97 


 


 20 11:01  20 11:20 11:20 11:01  20 11:01














Course





- Vital Signs


Vital signs: 





                                        











Temp Pulse Resp BP Pulse Ox


 


 97.8 F   82   16   197/104 H  97 


 


 20 11:01  20 11:01  20 11:20 11:01  20 11:01